# Patient Record
Sex: MALE | Race: AMERICAN INDIAN OR ALASKA NATIVE | NOT HISPANIC OR LATINO | URBAN - METROPOLITAN AREA
[De-identification: names, ages, dates, MRNs, and addresses within clinical notes are randomized per-mention and may not be internally consistent; named-entity substitution may affect disease eponyms.]

---

## 2017-12-01 ENCOUNTER — EMERGENCY (EMERGENCY)
Facility: HOSPITAL | Age: 37
LOS: 1 days | Discharge: HOME | End: 2017-12-01
Attending: EMERGENCY MEDICINE | Admitting: EMERGENCY MEDICINE
Payer: COMMERCIAL

## 2017-12-01 VITALS
OXYGEN SATURATION: 98 % | RESPIRATION RATE: 18 BRPM | DIASTOLIC BLOOD PRESSURE: 83 MMHG | SYSTOLIC BLOOD PRESSURE: 122 MMHG | TEMPERATURE: 98 F | HEIGHT: 69 IN | HEART RATE: 89 BPM | WEIGHT: 149.91 LBS

## 2017-12-01 PROCEDURE — 99283 EMERGENCY DEPT VISIT LOW MDM: CPT

## 2017-12-01 PROCEDURE — 99282 EMERGENCY DEPT VISIT SF MDM: CPT
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
## 2017-12-01 NOTE — ED ADULT NURSE NOTE - CHPI ED SYMPTOMS NEG
no difficulty bearing weight/no sleeping issues/no disorientation/no fussiness/no laceration/no loss of consciousness/no crying/no decreased eating/drinking/no dizziness/no back pain/no headache
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
## 2017-12-01 NOTE — ED PROVIDER NOTE - MUSCULOSKELETAL NECK EXAM
no deformity, pain or tenderness. no restriction of movement/supple/mild right lateral muscle tenderness/trachea midline

## 2023-07-03 NOTE — ED ADULT NURSE NOTE - NSSISCREENINGQ3_ED_A_ED
[None known] : None known [FreeTextEntry2] : Patient attended [employer] and obtained a degree in Sociology and English. 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 Patient enjoys work but there are challenges as previously noted.  No